# Patient Record
Sex: FEMALE | Race: WHITE
[De-identification: names, ages, dates, MRNs, and addresses within clinical notes are randomized per-mention and may not be internally consistent; named-entity substitution may affect disease eponyms.]

---

## 2019-10-12 ENCOUNTER — HOSPITAL ENCOUNTER (EMERGENCY)
Dept: HOSPITAL 97 - ER | Age: 29
Discharge: HOME | End: 2019-10-12
Payer: SELF-PAY

## 2019-10-12 VITALS — TEMPERATURE: 97.6 F | SYSTOLIC BLOOD PRESSURE: 109 MMHG | DIASTOLIC BLOOD PRESSURE: 73 MMHG | OXYGEN SATURATION: 100 %

## 2019-10-12 DIAGNOSIS — K04.7: Primary | ICD-10-CM

## 2019-10-12 LAB
BUN BLD-MCNC: 14 MG/DL (ref 7–18)
GLUCOSE SERPLBLD-MCNC: 89 MG/DL (ref 74–106)
HCT VFR BLD CALC: 39.3 % (ref 36–45)
LYMPHOCYTES # SPEC AUTO: 1.1 K/UL (ref 0.7–4.9)
PMV BLD: 8.2 FL (ref 7.6–11.3)
POTASSIUM SERPL-SCNC: 3.9 MMOL/L (ref 3.5–5.1)
RBC # BLD: 4.46 M/UL (ref 3.86–4.86)

## 2019-10-12 PROCEDURE — 80048 BASIC METABOLIC PNL TOTAL CA: CPT

## 2019-10-12 PROCEDURE — 96374 THER/PROPH/DIAG INJ IV PUSH: CPT

## 2019-10-12 PROCEDURE — 85025 COMPLETE CBC W/AUTO DIFF WBC: CPT

## 2019-10-12 PROCEDURE — 36415 COLL VENOUS BLD VENIPUNCTURE: CPT

## 2019-10-12 PROCEDURE — 70491 CT SOFT TISSUE NECK W/DYE: CPT

## 2019-10-12 PROCEDURE — 99284 EMERGENCY DEPT VISIT MOD MDM: CPT

## 2019-10-12 PROCEDURE — 96375 TX/PRO/DX INJ NEW DRUG ADDON: CPT

## 2019-10-12 NOTE — ER
Nurse's Notes                                                                                     

 [facility]                                                                 

Name: Ute Iglesias                                                                               

Age: 28 yrs                                                                                       

Sex: Female                                                                                       

: 1990                                                                                   

MRN: [id number]                                                                                   

Arrival Date: 10/12/2019                                                                          

Time: 16:34                                                                                       

Account#: [id number]                                                                            

Bed 14                                                                                            

Private MD:                                                                                       

Diagnosis: Dental Abscess left mandible                                                           

                                                                                                  

Presentation:                                                                                     

10/12                                                                                             

16:37 Presenting complaint: Patient states: I woke up with left sided facial swelling this    la1 

      morning, hurts when I swallow or touch it. Transition of care: patient was not received     

      from another setting of care. Onset of symptoms was 2019. Risk Assessment:      

      Do you want to hurt yourself or someone else? Patient reports no desire to harm self or     

      others. Initial Sepsis Screen: Does the patient meet any 2 criteria? No. Patient's          

      initial sepsis screen is negative. Does the patient have a suspected source of              

      infection? No. Patient's initial sepsis screen is negative. Care prior to arrival: None.    

16:37 Method Of Arrival: Ambulatory                                                           la1 

16:37 Acuity: JANAY 3                                                                           la1 

                                                                                                  

OB/GYN:                                                                                           

16:38 LMP 10/1/2019                                                                           la1 

                                                                                                  

Historical:                                                                                       

- Allergies:                                                                                      

16:38 No Known Allergies;                                                                     la1 

- PMHx:                                                                                           

16:38 PTSD;                                                                                   la1 

                                                                                                  

- Immunization history:: Adult Immunizations up to date.                                          

- Social history:: Smoking status: Patient/guardian denies using tobacco.                         

- Ebola Screening: : No symptoms or risks identified at this time.                                

                                                                                                  

                                                                                                  

Screenin:50 Abuse screen: Denies threats or abuse. Nutritional screening: No deficits noted.        tr5 

      Tuberculosis screening: No symptoms or risk factors identified. Fall Risk None              

      identified.                                                                                 

                                                                                                  

Assessment:                                                                                       

16:50 General: Appears uncomfortable, Behavior is calm, cooperative, appropriate for age.     tr5 

      Pain: Complains of pain in left jaw Pain does not radiate. Pain currently is 9 out of       

      10 on a pain scale. Quality of pain is described as aching, throbbing, Pain began           

      gradually, Is continuous, Alleviated by nothing. Aggravated by eating, drinking. Neuro:     

      Level of Consciousness is awake, alert, obeys commands, Oriented to person, place,          

      time,  are equal bilaterally Moves all extremities. Gait is steady.                    

      Cardiovascular: Heart tones present Capillary refill < 3 seconds Pulses are all             

      present. Edema To L jaw. Respiratory: Airway is patent Respiratory effort is even,          

      unlabored, Respiratory pattern is regular, symmetrical. GI: No signs and/or symptoms        

      were reported involving the gastrointestinal system. : No signs and/or symptoms were      

      reported regarding the genitourinary system. EENT: Poor dentition noted. Derm: Abscess      

      located on left jaw. Musculoskeletal: No signs and/or symptoms reported regarding the       

      musculoskeletal system.                                                                     

18:00 Reassessment: Patient appears in no apparent distress at this time. Patient and/or      tr5 

      family updated on plan of care and expected duration. Pain level reassessed. Patient is     

      alert, oriented x 3, equal unlabored respirations, skin warm/dry/pink.                      

                                                                                                  

Vital Signs:                                                                                      

16:38  / 73; Pulse 89; Resp 16; Temp 97.6; Pulse Ox 100% ; Weight 54.43 kg; Height 5    la1 

      ft. 1 in. (154.94 cm);                                                                      

16:38 Body Mass Index 22.67 (54.43 kg, 154.94 cm)                                             la1 

                                                                                                  

ED Course:                                                                                        

16:34 Patient arrived in ED.                                                                  mr  

16:37 Triage completed.                                                                       la1 

16:38 Rodger Lennon MD is Attending Physician.                                              kdr 

16:38 Arm band placed on left wrist.                                                          la1 

16:50 Bed in low position. Call light in reach. Side rails up X 1. Adult w/ patient.          tr5 

16:50 Initial lab(s) drawn, by me, sent to lab. Inserted saline lock: 20 gauge in right       tr5 

      antecubital area, using aseptic technique.                                                  

16:54 Kit Perdue, RN is Primary Nurse.                                                 tr5 

17:18 Patient moved to CT via wheelchair.                                                     tr5 

17:24 CT Soft Tissue Neck W/contr In Process Unspecified.                                     EDMS

17:27 CT completed. Patient tolerated procedure well. Note: consent for ct exam w/o upt       bq  

      signed. Patient moved back from CT.                                                         

19:17 No provider procedures requiring assistance completed. IV discontinued.                 tr5 

                                                                                                  

Administered Medications:                                                                         

17:00 Drug: NS 0.9% 1000 ml Route: IV; Rate: 1 bolus; Site: right antecubital;                tr5 

17:01 Drug: morphine 4 mg {Note: RASS:0.} Route: IVP; Site: right antecubital;                tr5 

17:30 Follow up: Response: Pain is decreased                                                  tr5 

17:01 Drug: Zofran 4 mg Route: IVP; Site: right antecubital;                                  tr5 

18:21 Follow up: Response: Marked relief of symptoms                                          tr5 

18:12 Drug: Clindamycin 900 mg Route: IVPB; Infused Over: 30 mins; Site: right antecubital;   tr5 

18:13 Drug: Norco 10 mg-325 mg 1 tabs {Note: RASS:0.} Route: PO;                              tr5 

18:20 Drug: SOLU-Medrol 125 mg Route: IVP; Site: right antecubital;                           tr5 

18:21 Drug: Flagyl 500 mg Route: PO;                                                          tr5 

                                                                                                  

                                                                                                  

Outcome:                                                                                          

18:21 Discharge ordered by MD.                                                                madeline 

19:17 Discharged to home ambulatory.                                                          tr5 

19:17 Condition: stable                                                                           

19:17 Discharge instructions given to patient, Instructed on discharge instructions, follow       

      up and referral plans. medication usage, Demonstrated understanding of instructions,        

      follow-up care, medications, Prescriptions given X 4.                                       

19:19 Patient left the ED.                                                                    tr5 

                                                                                                  

Signatures:                                                                                       

Dispatcher [employer]                           EDMS                                                 

Rodger Lennon MD MD kdr Rivera, Mary mr Quilty, Betty bq Attema, Lee, RN                         RN   Kit Figueredo RN                   RN   tr5                                                  

                                                                                                  

**************************************************************************************************

## 2019-10-12 NOTE — EDPHYS
Physician Documentation                                                                           

 [facility]                                                                 

Name: Ute Iglesias                                                                               

Age: 28 yrs                                                                                       

Sex: Female                                                                                       

: 1990                                                                                   

MRN: [id number]                                                                                   

Arrival Date: 10/12/2019                                                                          

Time: 16:34                                                                                       

Account#: [id number]                                                                            

Bed 14                                                                                            

Private MD:                                                                                       

ED Physician Rodger Lennon                                                                       

HPI:                                                                                              

10/12                                                                                             

16:52 This 28 yrs old  Female presents to ER via Ambulatory with complaints of Left  kdr 

      jaw Facial Swelling.                                                                        

18:08 The patient presents with pain, swelling. The problem is located in the lower left      kdr 

      second molar, lower left first molar, lower left second bicuspid and left submandibular     

      area. Onset: The symptoms/episode began/occurred suddenly, gradually, yesterday.            

      Duration: The symptoms are continuous, and are steadily getting worse. Modifying            

      factors: The symptoms are alleviated by nothing, the symptoms are aggravated by             

      chewing, food, Swallowing. Associated signs and symptoms: The patient has no apparent       

      associated signs or symptoms. Severity of symptoms: At their worst the symptoms were        

      moderate, in the emergency department the symptoms are unchanged. The patient has not       

      experienced similar symptoms in the past. The patient has not recently seen a physician.    

                                                                                                  

OB/GYN:                                                                                           

16:38 LMP 10/1/2019                                                                           la1 

                                                                                                  

Historical:                                                                                       

- Allergies:                                                                                      

16:38 No Known Allergies;                                                                     la1 

- PMHx:                                                                                           

16:38 PTSD;                                                                                   la1 

                                                                                                  

- Immunization history:: Adult Immunizations up to date.                                          

- Social history:: Smoking status: Patient/guardian denies using tobacco.                         

- Ebola Screening: : No symptoms or risks identified at this time.                                

                                                                                                  

                                                                                                  

ROS:                                                                                              

18:08 Constitutional: Negative for fever, chills, and weight loss, Eyes: Negative for injury, kdr 

      pain, redness, and discharge, Neck: Negative for injury, pain, and swelling,                

      Cardiovascular: Negative for chest pain, palpitations, and edema, Respiratory: Negative     

      for shortness of breath, cough, wheezing, and pleuritic chest pain, Abdomen/GI:             

      Negative for abdominal pain, nausea, vomiting, diarrhea, and constipation, Back:            

      Negative for injury and pain.                                                               

18:08 ENT: Positive for dental pain, difficulty swallowing, of the left cheek and left            

      mandible.                                                                                   

                                                                                                  

Exam:                                                                                             

18:08 Constitutional:  This is a well developed, well nourished patient who is awake, alert,  kdr 

      and in no acute distress. Eyes:  Pupils equal round and reactive to light, extra-ocular     

      motions intact.  Lids and lashes normal.  Conjunctiva and sclera are non-icteric and        

      not injected.  Cornea within normal limits.  Periorbital areas with no swelling,            

      redness, or edema. Neck:  Trachea midline, no thyromegaly or masses palpated, and no        

      cervical lymphadenopathy.  Supple, full range of motion without nuchal rigidity, or         

      vertebral point tenderness.  No Meningismus. Chest/axilla:  Normal chest wall               

      appearance and motion.  Nontender with no deformity.  No lesions are appreciated.           

18:08 ENT: Mouth: Lips: normal, Oral mucosa: normal, moderate swelling and tenderness to left     

      mandible, No pain or swelling to left parotid gland/salivary gland..                        

                                                                                                  

Vital Signs:                                                                                      

16:38  / 73; Pulse 89; Resp 16; Temp 97.6; Pulse Ox 100% ; Weight 54.43 kg; Height 5    la1 

      ft. 1 in. (154.94 cm);                                                                      

16:38 Body Mass Index 22.67 (54.43 kg, 154.94 cm)                                             la1 

                                                                                                  

MDM:                                                                                              

18:08 Data reviewed: vital signs, nurses notes, lab test result(s), radiologic studies.       kdr 

      Counseling: I had a detailed discussion with the patient and/or guardian regarding: the     

      historical points, exam findings, and any diagnostic results supporting the                 

      discharge/admit diagnosis, lab results, radiology results, the need for outpatient          

      follow up. Special discussion: I discussed with the patient/guardian in detail that at      

      this point there is no indication for admission to the hospital. It is understood,          

      however, that if the symptoms persist or worsen the patient needs to return immediately     

      for re-evaluation.                                                                          

18:21 Patient medically screened.                                                             kdr 

                                                                                                  

10/12                                                                                             

16:51 Order name: CBC with Diff; Complete Time: 17:47                                         kdr 

10/12                                                                                             

16:51 Order name: Chem 7; Complete Time: 17:47                                                kdr 

10/12                                                                                             

16:51 Order name: CT Soft Tissue Neck W/contr; Complete Time: 17:47                           kdr 

                                                                                                  

Administered Medications:                                                                         

17:00 Drug: NS 0.9% 1000 ml Route: IV; Rate: 1 bolus; Site: right antecubital;                tr5 

17:01 Drug: morphine 4 mg {Note: RASS:0.} Route: IVP; Site: right antecubital;                tr5 

17:30 Follow up: Response: Pain is decreased                                                  tr5 

17:01 Drug: Zofran 4 mg Route: IVP; Site: right antecubital;                                  tr5 

18:21 Follow up: Response: Marked relief of symptoms                                          tr5 

18:12 Drug: Clindamycin 900 mg Route: IVPB; Infused Over: 30 mins; Site: right antecubital;   tr5 

18:13 Drug: Norco 10 mg-325 mg 1 tabs {Note: RASS:0.} Route: PO;                              tr5 

18:20 Drug: SOLU-Medrol 125 mg Route: IVP; Site: right antecubital;                           tr5 

18:21 Drug: Flagyl 500 mg Route: PO;                                                          tr5 

                                                                                                  

                                                                                                  

Disposition:                                                                                      

10/12/19 18:21 Discharged to Home. Impression: Dental Abscess left mandible.                      

- Condition is Stable.                                                                            

- Discharge Instructions: Dental Pain, Easy-to-Read, Dental Abscess, Easy-to-Read.                

- Prescriptions for Augmentin 875- 125 mg Oral Tablet - take 1 tablet by ORAL route               

  every 12 hours for 10 days; 20 tablet. Flagyl 500 mg Oral Tablet - take 1 tablet by             

  ORAL route every 6 hours for 10 days; 40 tablet. Tylenol- Codeine #3 300-30 mg Oral             

  Tablet - take 2 tablets by ORAL route every 6 hours As needed; 12 tablet. Medrol                

  (Mynor) 4 mg Oral Tablets, Dose Pack - take 1 tablet by ORAL route as directed - follow           

  package instructions; 1 packet.                                                                 

- Medication Reconciliation Form, Thank You Letter, Antibiotic Education, Prescription            

  Opioid Use, Work release form form.                                                             

- Follow up: Private Physician; When: 2 - 3 days; Reason: If symptoms return, Further             

  diagnostic work-up, Recheck today's complaints, Continuance of care, Re-evaluation by           

  your physician.                                                                                 

- Problem is new.                                                                                 

- Symptoms have improved.                                                                         

                                                                                                  

                                                                                                  

                                                                                                  

Signatures:                                                                                       

Dispatcher [employer]                           EDMS                                                 

Rodger Lennon MD MD kdr Attema, Lee, RN                         RN   la1                                                  

Kit Perdue RN                   RN   tr5                                                  

                                                                                                  

Corrections: (The following items were deleted from the chart)                                    

19:19 18:21 10/12/2019 18:21 Discharged to Home. Impression: Dental Abscess left mandible.    tr5 

      Condition is Stable. Discharge Instructions: Dental Abscess, Easy-to-Read.                  

      Prescriptions for Augmentin 875-125 mg Oral Tablet - take 1 tablet by ORAL route every      

      12 hours for 10 days; 20 tablet, Flagyl 500 mg Oral Tablet - take 1 tablet by ORAL          

      route every 6 hours for 10 days; 40 tablet, Tylenol-Codeine #3 300-30 mg Oral Tablet -      

      take 2 tablets by ORAL route every 6 hours As needed; 12 tablet, Medrol (Mynor) 4 mg Oral     

      Tablets, Dose Pack - take 1 tablet by ORAL route as directed - follow package               

      instructions; 1 packet. and Forms are Medication Reconciliation Form, Thank You Letter,     

      Antibiotic Education, Prescription Opioid Use. Follow up: Private Physician; When: 2 -      

      3 days; Reason: If symptoms return, Further diagnostic work-up, Recheck today's             

      complaints, Continuance of care, Re-evaluation by your physician. Problem is new.           

      Symptoms have improved. kdr                                                                 

                                                                                                  

**************************************************************************************************

## 2019-10-12 NOTE — RAD REPORT
EXAM DESCRIPTION:  CT - Soft Tissue Neck W/Contr

 

CLINICAL HISTORY:  Pain and swelling to left jaw - unable to swallow

 

COMPARISON:  No comparisons

 

TECHNIQUE  All CT scans are performed using dose optimization technique as appropriate and may includ
e automated exposure control or mA/KV adjustment according to patient size.

 

FINDINGS:  Significant soft tissue swelling is seen about the left aspect of the face mandible. There
 is reticulation of the subcutaneous fat and skin thickening present. A subperiosteal abscess is like
ly present along the anterior margin of the mandible measuring 3 mm in thickness an 18 mm in transver
se dimension. Clear etiology for this is not well delineated but may be odontogenic in origin. Mild a
djacent soft tissue is present within the inflamed fat, likely enlarged lymph nodes.

 

Multiple enlarged lymph nodes are present in the level I and level II chain. These are likely reactiv
e in nature. No prevertebral abscess seen.

 

IMPRESSION:  Significant soft tissue swelling is seen about the left aspect of the mandible with subp
eriosteal abscess suspected anteriorly as described.

## 2020-02-16 ENCOUNTER — HOSPITAL ENCOUNTER (EMERGENCY)
Dept: HOSPITAL 97 - ER | Age: 30
Discharge: HOME | End: 2020-02-16
Payer: SELF-PAY

## 2020-02-16 VITALS — SYSTOLIC BLOOD PRESSURE: 119 MMHG | TEMPERATURE: 97.7 F | OXYGEN SATURATION: 99 % | DIASTOLIC BLOOD PRESSURE: 87 MMHG

## 2020-02-16 DIAGNOSIS — N30.00: Primary | ICD-10-CM

## 2020-02-16 DIAGNOSIS — F17.210: ICD-10-CM

## 2020-02-16 LAB
BUN BLD-MCNC: 11 MG/DL (ref 7–18)
GLUCOSE SERPLBLD-MCNC: 109 MG/DL (ref 74–106)
HCT VFR BLD CALC: 42 % (ref 36–45)
LYMPHOCYTES # SPEC AUTO: 1.3 K/UL (ref 0.7–4.9)
PMV BLD: 8.1 FL (ref 7.6–11.3)
POTASSIUM SERPL-SCNC: 3.8 MMOL/L (ref 3.5–5.1)
RBC # BLD: 4.78 M/UL (ref 3.86–4.86)
UA COMPLETE W REFLEX CULTURE PNL UR: (no result)

## 2020-02-16 PROCEDURE — 80048 BASIC METABOLIC PNL TOTAL CA: CPT

## 2020-02-16 PROCEDURE — 81025 URINE PREGNANCY TEST: CPT

## 2020-02-16 PROCEDURE — 85025 COMPLETE CBC W/AUTO DIFF WBC: CPT

## 2020-02-16 PROCEDURE — 87086 URINE CULTURE/COLONY COUNT: CPT

## 2020-02-16 PROCEDURE — 81015 MICROSCOPIC EXAM OF URINE: CPT

## 2020-02-16 PROCEDURE — 99283 EMERGENCY DEPT VISIT LOW MDM: CPT

## 2020-02-16 PROCEDURE — 87088 URINE BACTERIA CULTURE: CPT

## 2020-02-16 PROCEDURE — 36415 COLL VENOUS BLD VENIPUNCTURE: CPT

## 2020-02-16 PROCEDURE — 76770 US EXAM ABDO BACK WALL COMP: CPT

## 2020-02-16 PROCEDURE — 81003 URINALYSIS AUTO W/O SCOPE: CPT

## 2020-02-16 NOTE — EDPHYS
Physician Documentation                                                                           

 [facility]                                                                 

Name: Ute Iglesias                                                                               

Age: 29 yrs                                                                                       

Sex: Female                                                                                       

: 1990                                                                                   

MRN: [id number]                                                                                   

Arrival Date: 2020                                                                          

Time: 12:10                                                                                       

Account#: [id number]                                                                            

Bed 24                                                                                            

Private MD:                                                                                       

ED Physician Oleg Whitney                                                                      

HPI:                                                                                              

                                                                                             

13:29 This 29 yrs old  Female presents to ER via Ambulatory with complaints of achy, jr8 

      flank pain.                                                                                 

13:29 The patient complains of pain in the right flank. The pain does not radiate. Onset: The jr8 

      symptoms/episode began/occurred gradually, 2 week(s) ago. Modifying factors: The            

      symptoms are alleviated by nothing. the symptoms are aggravated by nothing. Associated      

      signs and symptoms: The patient has no apparent associated signs or symptoms. Severity      

      of pain: At its worst the pain was mild in the emergency department the pain is             

      unchanged. The patient has not experienced similar symptoms in the past. The patient        

      has not recently seen a physician. Stated that she has felt achy. Has had flank pain on     

      right side on/off as well that is not going away .                                          

                                                                                                  

Historical:                                                                                       

- Allergies:                                                                                      

12:23 No Known Allergies;                                                                     ss  

- Home Meds:                                                                                      

12:23 None [Active];                                                                          ss  

- PMHx:                                                                                           

12:23 PTSD;                                                                                   ss  

- PSHx:                                                                                           

12:23 R wrist;                                                                                ss  

                                                                                                  

- Immunization history:: Adult Immunizations up to date.                                          

- Coronavirus screen:: The patient has NOT traveled to [place] in the past 14 days.                 

  Proceed with normal triage process as indicated.                                                

- Social history:: Smoking status: Patient reports the use of cigarette tobacco                   

  products, smokes one-half pack cigarettes per day.                                              

- Ebola Screening: : Patient denies exposure to infectious person Patient denies travel           

  to an Ebola-affected area in the 21 days before illness onset.                                  

                                                                                                  

                                                                                                  

ROS:                                                                                              

13:29 Eyes: Negative for injury, pain, redness, and discharge, ENT: Negative for injury,      jr8 

      pain, and discharge, Neck: Negative for injury, pain, and swelling, Cardiovascular:         

      Negative for chest pain, palpitations, and edema, Respiratory: Negative for shortness       

      of breath, cough, wheezing, and pleuritic chest pain, Abdomen/GI: Negative for              

      abdominal pain, nausea, vomiting, diarrhea, and constipation, MS/Extremity: Negative        

      for injury and deformity, Skin: Negative for injury, rash, and discoloration, Neuro:        

      Negative for headache, weakness, numbness, tingling, and seizure.                           

13:29 Back: Positive for flank pain, on the right.                                                

                                                                                                  

Exam:                                                                                             

13:29 Eyes:  Pupils equal round and reactive to light, extra-ocular motions intact.  Lids and jr8 

      lashes normal.  Conjunctiva and sclera are non-icteric and not injected.  Cornea within     

      normal limits.  Periorbital areas with no swelling, redness, or edema. ENT:  Nares          

      patent. No nasal discharge, no septal abnormalities noted.  Tympanic membranes are          

      normal and external auditory canals are clear.  Oropharynx with no redness, swelling,       

      or masses, exudates, or evidence of obstruction, uvula midline.  Mucous membranes           

      moist. Neck:  Trachea midline, no thyromegaly or masses palpated, and no cervical           

      lymphadenopathy.  Supple, full range of motion without nuchal rigidity, or vertebral        

      point tenderness.  No Meningismus. Cardiovascular:  Regular rate and rhythm with a          

      normal S1 and S2.  No gallops, murmurs, or rubs.  Normal PMI, no JVD.  No pulse             

      deficits. Respiratory:  Lungs have equal breath sounds bilaterally, clear to                

      auscultation and percussion.  No rales, rhonchi or wheezes noted.  No increased work of     

      breathing, no retractions or nasal flaring. Abdomen/GI:  Soft, non-tender, with normal      

      bowel sounds.  No distension or tympany.  No guarding or rebound.  No evidence of           

      tenderness throughout. Back:  No spinal tenderness.  No costovertebral tenderness.          

      Full range of motion. Skin:  Warm, dry with normal turgor.  Normal color with no            

      rashes, no lesions, and no evidence of cellulitis. MS/ Extremity:  Pulses equal, no         

      cyanosis.  Neurovascular intact.  Full, normal range of motion. Neuro:  Awake and           

      alert, GCS 15, oriented to person, place, time, and situation.  Cranial nerves II-XII       

      grossly intact.  Motor strength 5/5 in all extremities.  Sensory grossly intact.            

      Cerebellar exam normal.  Normal gait.                                                       

                                                                                                  

Vital Signs:                                                                                      

12:23  / 87; Pulse 94; Resp 17; Temp 97.7(O); Pulse Ox 99% on R/A; Weight 54.43 kg;       

      Height 5 ft. 2 in. (157.48 cm); Pain 4/10;                                                  

12:23 Body Mass Index 21.95 (54.43 kg, 157.48 cm)                                             ss  

                                                                                                  

MDM:                                                                                              

12:21 Patient medically screened.                                                             [facility] 

13:54 Data reviewed: vital signs, nurses notes, lab test result(s), radiologic studies,       [facility] 

      ultrasound. Data interpreted: Pulse oximetry: on room air is 99 %. Interpretation:          

      normal. Counseling: I had a detailed discussion with the patient and/or guardian            

      regarding: the historical points, exam findings, and any diagnostic results supporting      

      the discharge/admit diagnosis, lab results, radiology results, the need for outpatient      

      follow up, a family practitioner, to return to the emergency department if symptoms         

      worsen or persist or if there are any questions or concerns that arise at home.             

                                                                                                  

                                                                                             

12:32 Order name: Urine Microscopic Only                                                        

                                                                                             

12:33 Order name: Urine Dipstick--Ancillary (enter results)                                     

                                                                                             

12:33 Order name: Urine Pregnancy--Ancillary (enter results)                                    

                                                                                             

12:34 Order name: CBC with Diff                                                               [facility] 

                                                                                             

12:34 Order name: Basic Metabolic Panel                                                       [facility] 

                                                                                             

12:44 Order name: Urine Pregnancy--Ancillary; Complete Time: 13:12                            [facility]

                                                                                             

12:34 Order name: IV; Complete Time: 12:43                                                    [facility] 

                                                                                             

12:34 Order name: US Rp Exam Complete                                                         [facility] 

                                                                                             

12:44 Order name: Urine Dipstick-Ancillary; Complete Time: 13:11                              [facility]

                                                                                             

13:02 Order name: CBC with Automated Diff; Complete Time: 13:11                               [facility]

                                                                                             

13:02 Order name: Basic Metabolic Panel; Complete Time: 13:11                                 [facility]

                                                                                             

13:14 Order name: Urine Microscopic Only; Complete Time: 13:14                                [facility]

                                                                                                  

Administered Medications:                                                                         

No medications were administered                                                                  

                                                                                                  

                                                                                                  

Disposition:                                                                                      

                                                                                             

08:31 Co-signature as Attending Physician, Oleg Whitney MD I agree with the assessment and  [place] 

      plan of care.                                                                               

                                                                                                  

Disposition:                                                                                      

20 13:55 Discharged to Home. Impression: Acute cystitis.                                    

- Condition is Stable.                                                                            

- Discharge Instructions: Urinary Tract Infection, Adult.                                         

- Prescriptions for Macrobid 100 mg Oral Capsule - take 1 capsule by ORAL route every             

  12 hours for 7 days; 14 capsule.                                                                

- Work release form, Medication Reconciliation Form, Thank You Letter, Antibiotic                 

  Education, Prescription Opioid Use form.                                                        

- Follow up: Private Physician; When: 2 - 3 days; Reason: Recheck today's complaints,             

  Continuance of care, Re-evaluation by your physician.                                           

- Problem is new.                                                                                 

- Symptoms have improved.                                                                         

                                                                                                  

                                                                                                  

                                                                                                  

Signatures:                                                                                       

Dispatcher [employer]                           Oleg Macario MD MD cha Smirch, Shelby, RN                      RN   Manuel Kothari PA PA   jr8                                                  

                                                                                                  

Corrections: (The following items were deleted from the chart)                                    

                                                                                             

14:04 13:55 2020 13:55 Discharged to Home. Impression: Acute cystitis. Condition is     ss  

      Stable. Forms are Medication Reconciliation Form, Thank You Letter, Antibiotic              

      Education, Prescription Opioid Use. Follow up: Private Physician; When: 2 - 3 days;         

      Reason: Recheck today's complaints, Continuance of care, Re-evaluation by your              

      physician. Problem is new. Symptoms have improved. jr8                                      

                                                                                                  

**************************************************************************************************

## 2020-02-16 NOTE — RAD REPORT
EXAM DESCRIPTION:  US - Renal Ultrasound-Complete - 2/16/2020 2:49 pm

 

CLINICAL HISTORY:  . Right flank pain

 

COMPARISON:  None.

 

FINDINGS:  The right kidney measures 10 cm with borderline increased echotexture

 

The left kidney measures 10 centimeters with borderline increased echotexture .

 

Hydronephrosis is not seen. No gross abnormality of bladder

 

IMPRESSION:  Borderline increased renal echotexture. This may indicate parenchymal disease

## 2020-02-16 NOTE — ER
Nurse's Notes                                                                                     

 [facility]                                                                 

Name: Ute Iglesias                                                                               

Age: 29 yrs                                                                                       

Sex: Female                                                                                       

: 1990                                                                                   

MRN: [id number]                                                                                   

Arrival Date: 2020                                                                          

Time: 12:10                                                                                       

Account#: [id number]                                                                            

Bed 24                                                                                            

Private MD:                                                                                       

Diagnosis: Acute cystitis                                                                         

                                                                                                  

Presentation:                                                                                     

                                                                                             

12:21 Presenting complaint: Patient states: body aches, intermittent fever, congestion and    ss  

      fatigue x 2 weeks. Transition of care: patient was not received from another setting of     

      care. Onset of symptoms was 2020. Risk Assessment: Do you want to hurt          

      yourself or someone else? Patient reports no desire to harm self or others. Initial         

      Sepsis Screen: Does the patient meet any 2 criteria? HR > 90 bpm. Does the patient have     

      a suspected source of infection? No. Patient's initial sepsis screen is negative. Care      

      prior to arrival: None.                                                                     

12:21 Method Of Arrival: Ambulatory                                                           ss  

12:21 Acuity: JANAY 4                                                                           ss  

                                                                                                  

Historical:                                                                                       

- Allergies:                                                                                      

12:23 No Known Allergies;                                                                     ss  

- Home Meds:                                                                                      

12:23 None [Active];                                                                          ss  

- PMHx:                                                                                           

12:23 PTSD;                                                                                   ss  

- PSHx:                                                                                           

12:23 R wrist;                                                                                ss  

                                                                                                  

- Immunization history:: Adult Immunizations up to date.                                          

- Coronavirus screen:: The patient has NOT traveled to [place] in the past 14 days.                 

  Proceed with normal triage process as indicated.                                                

- Social history:: Smoking status: Patient reports the use of cigarette tobacco                   

  products, smokes one-half pack cigarettes per day.                                              

- Ebola Screening: : Patient denies exposure to infectious person Patient denies travel           

  to an Ebola-affected area in the 21 days before illness onset.                                  

                                                                                                  

                                                                                                  

Screenin:23 Abuse screen: Denies threats or abuse. Denies injuries from another. Nutritional        ss  

      screening: No deficits noted. Tuberculosis screening: Never had TB. Fall Risk None          

      identified.                                                                                 

                                                                                                  

Assessment:                                                                                       

12:23 General: Appears in no apparent distress. comfortable, Behavior is Reports fever for >  ss  

      3 days, feeling ill for x 2 weeks. fatigue for >3 days. Pain: Complains of pain in          

      general body aches Pain currently is 4 out of 10 on a pain scale. Neuro: Level of           

      Consciousness is awake, alert, obeys commands, Oriented to person, place, time,             

      situation. Cardiovascular: Capillary refill < 3 seconds is brisk in bilateral fingers.      

      Respiratory: Respiratory effort is even, unlabored, Respiratory pattern is regular,         

      symmetrical. Respiratory: Reports cough that is non-productive, Breath sounds are clear     

      bilaterally. GI: Abdomen is non-distended, Patient currently denies abdominal pain,         

      nausea, vomiting. : Denies burning with urination, urinary frequency. EENT: Nares are     

      clear. EENT: Reports nasal congestion. Derm: Skin is intact, is healthy with good           

      turgor, Skin is dry, Skin is pink, warm \T\ dry. normal. Musculoskeletal: Circulation,      

      motion, and sensation intact. Range of motion: intact in all extremities, Swelling          

      absent.                                                                                     

13:30 Reassessment: Patient appears in no apparent distress at this time. Patient and/or      ss  

      family updated on plan of care and expected duration. Pain level reassessed. Patient is     

      alert, oriented x 3, equal unlabored respirations, skin warm/dry/pink.                      

                                                                                                  

Vital Signs:                                                                                      

12:23  / 87; Pulse 94; Resp 17; Temp 97.7(O); Pulse Ox 99% on R/A; Weight 54.43 kg;     ss  

      Height 5 ft. 2 in. (157.48 cm); Pain 4/10;                                                  

12:23 Body Mass Index 21.95 (54.43 kg, 157.48 cm)                                               

                                                                                                  

ED Course:                                                                                        

12:10 Patient arrived in ED.                                                                  mr  

12:20 Manuel Colindres PA is PHCP.                                                               jr8 

12:20 Oleg Whitney MD is Attending Physician.                                             [id number] 

12:21 Swati Vasquez, RN is Primary Nurse.                                                      

12:22 Triage completed.                                                                         

12:23 Arm band placed on right wrist.                                                         ss  

12:23 Patient has correct armband on for positive identification. Bed in low position. Call   ss  

      light in reach.                                                                             

12:43 CBC with Diff Sent.                                                                       

12:43 Basic Metabolic Panel Sent.                                                               

12:43 Urine Pregnancy--Ancillary (enter results) Sent.                                          

12:43 Urine Dipstick--Ancillary (enter results) Sent.                                           

12:43 Urine Microscopic Only Sent.                                                              

14:03 No provider procedures requiring assistance completed. IV discontinued, intact,         ss  

      bleeding controlled, No redness/swelling at site. Pressure dressing applied.                

                                                                                                  

Administered Medications:                                                                         

No medications were administered                                                                  

                                                                                                  

                                                                                                  

Outcome:                                                                                          

13:55 Discharge ordered by MD.                                                                [id number] 

14:03 Discharged to home ambulatory.                                                            

14:03 Condition: good                                                                             

14:03 Discharge instructions given to patient, Instructed on discharge instructions, follow       

      up and referral plans. medication usage, Demonstrated understanding of instructions,        

      follow-up care, medications, Prescriptions given X 1.                                       

14:04 Patient left the ED.                                                                      

                                                                                                  

Signatures:                                                                                       

Ursula Leal Shelby, RN                      RN                                                      

Manuel Colindres PA PA   jr8                                                  

                                                                                                  

**************************************************************************************************

## 2020-05-24 ENCOUNTER — HOSPITAL ENCOUNTER (EMERGENCY)
Dept: HOSPITAL 97 - ER | Age: 30
Discharge: LEFT BEFORE DISCHARGE | End: 2020-05-24
Payer: SELF-PAY

## 2020-05-24 DIAGNOSIS — Z02.9: Primary | ICD-10-CM

## 2020-05-24 DIAGNOSIS — Z53.21: ICD-10-CM

## 2020-05-24 NOTE — XMS REPORT
Clinical Summary

                             Created on:May 24, 2020



Patient:Ute Iglesias

Sex:Female

:1990

External Reference #:[id number]





Demographics







                          Address                   [address]



                                                    [place] [address]

 

                          Home Phone                [phone]

 

                          Mobile Phone              [phone]

 

                          Preferred Language        ENG

 

                          Marital Status            Single

 

                          Protestant Affiliation     Unknown

 

                          Race                      Unknown

 

                          Ethnic Group              Unknown









Author







                          Organization              [facility]

ict

 

                          Address                   [address] [place] [id number]









Support







                Name            Relationship    Address         Phone

 

                Zuleyka Bill    Unavailable     Unavailable     [phone]









Care Team Providers







                    Name                Role                Phone

 

                    Unavailable         Primary Care Provider Unavailable









Allergies

No Known Allergies



Medications







          Medication Sig       Dispensed Refills   Start Date End Date  Status

 

          ibuprofen (MOTRIN) 800 Take 1 tablet by 30 tablet 0         2018

           Active



          mg tabletIndications: mouth every 8                                   

      



          Pain, dental hours as needed                                         



                    for Pain.                                         

 

          acetaminophen-codeine Take 1 tablet by 13 tablet 0         2018 

          Active



          (TYLENOL/CODEINE #3) mouth every 4                                    

     



          300-30 mg per hours as needed                                         



          tabletIndications: for Pain.                                         



          Pain, dental                                                   







Active Problems







                          Problem                   Noted Date

 

                          Pain, dental              







Social History







             Tobacco Use  Types        Packs/Day    Years Used   Date

 

             Never Smoker                                        









                Smokeless Tobacco: Never Used                                 









                Alcohol Use     Drinks/Week     oz/Week         Comments

 

                Yes                                             









                          Sex Assigned at Birth     Date Recorded

 

                          Not on file               









                    Job Start Date      Occupation          Industry

 

                    Not on file         Not on file         Not on file









                    Travel History      Travel Start        Travel End









                                        No recent travel history available.







Last Filed Vital Signs

Not on file



Plan of Treatment







                Health Maintenance Due Date        Last Done       Comments

 

                Cervical Cancer Scrn (3 Yrs) 2010                      

 

                IMM Influenza Seasonal Oct to March (>/= 19 yrs) 10/01/2020     

                 







Results

Not on fileafter 2019



Insurance







          Payer     Benefit Plan / Subscriber ID Effective Dates Phone     Addre

ss   Type



                    Group                                             

 

          [facility]      SELF-PAY  xxxxxxxxx 2018-Pres [phone] [id number] 



           SELF-PAY   UNSCREENED            ent        [address] [place] [address] 



                                                            [id number]     









           Guarantor Name Account Type Relation to Date of    Phone      Billing

 Address



                                 Patient    Birth                 

 

           Ute Iglesias Personal/Famil Self       1990 [phone]



           Melany     y                                (Home)     [address]







                                                                  [place]



                                                                  [id number]

## 2020-05-24 NOTE — ER
Nurse's Notes                                                                                     

 [facility]                                                                 

Name: Ute Iglesias                                                                               

Age: 29 yrs                                                                                       

Sex: Female                                                                                       

: 1990                                                                                   

MRN: [id number]                                                                                   

Arrival Date: 2020                                                                          

Time: 02:47                                                                                       

Account#: [id number]                                                                            

Bed Waiting                                                                                       

Private MD:                                                                                       

Diagnosis:                                                                                        

                                                                                                  

ED Course:                                                                                        

                                                                                             

02:47 Patient arrived in ED.                                                                  cl3 

03:50 Patient's name was called from ER lobby. No response. Unable to locate patient. Will    lp1 

      disposition as left without being seen by a provider.                                       

                                                                                                  

Administered Medications:                                                                         

No medications were administered                                                                  

                                                                                                  

                                                                                                  

Outcome:                                                                                          

03:50 Patient left the ED.                                                                    lp1 

                                                                                                  

Signatures:                                                                                       

Janae Lozano RN                         RN   lp1                                                  

Jonah Morris                                cl3                                                  

                                                                                                  

**************************************************************************************************

## 2020-05-24 NOTE — XMS REPORT
Patient Summary Document

                             Created on:May 24, 2020



Patient:MILANA VOGEL

Sex:Female

:1990

External Reference #:[id number]





Demographics







                          Address                   [address]



                                                    [place] [address]

 

                          Home Phone                [phone]

 

                          Mobile Phone              [phone]

 

                          Email Address             NONE

 

                          Preferred Language        English

 

                          Marital Status            Unknown

 

                          Nondenominational Affiliation     Unknown

 

                          Race                      Unknown

 

                          Additional Race(s)        Unavailable

 

                          Ethnic Group              Unknown









Author







                          Organization              [facility]

t

 

                          Address                   [address]



                                                    [place] [address]

 

                          Phone                     [phone]









Support







                Name            Relationship    Address         Phone

 

                Fly         Other           Unavailable     [phone]









Care Team Providers







                    Name                Role                Phone

 

                    Unavailable         Unavailable         Unavailable









Problems







       Condition Condition Condition Status Onset  Resolution Last   Treating Co

mments 

Source



       Name   Details Category        Date   Date   Treatment Clinician        



                                                 Date                 

 

       Pain,  Pain,  Disease Active                                    [place]



       dental dental                                                  Health







Allergies, Adverse Reactions, Alerts

This patient has no known allergies or adverse reactions.



Social History







           Social Habit Start Date Stop Date  Quantity   Comments   Source

 

           Sex Assigned At                                             [facility]

alth



           Birth                                                  

 

           Alcohol intake 2018                       [facility]

lt



                      00:00:00   00:00:00                         









                Smoking Status  Start Date      Stop Date       Source

 

                Never smoker                                    [facility]







Medications







       Ordered Filled Start  Stop   Current Ordering Indication Dosage Frequency

 Signature

                    Comments            Components          Source



     Medication Medication Date Date Medication? Clinician                (SIG) 

          



     Name Name                                                   

 

     ibuprofen      2018      Yes       Pain, 800mg      Take 1           [facility]

is



     (MOTRIN)      2-30                dental           tablet by           [facility]

th



     800 mg      00:00:                               mouth           



     tablet      00                                 every 8           



                                                  hours as           



                                                  needed for           



                                                  Pain.           

 

     acetaminoph      2018      Yes       Pain, 1{tbl}      Take 1           [facility]

[facility]



     en-codeine      2-30                dental           tablet by           

alth



     (TYLENOL/CO      00:00:                               mouth           



     DEINE #3)      00                                 every 4           



     300-30 mg                                         hours as           



     per tablet                                         needed for           



                                                  Pain.           







Procedures

This patient has no known procedures.



Plan of Care







             Planned Activity Planned Date Details      Comments     Source

 

             Future Scheduled Test 2020-10-01 00:00:00 IMM Influenza            

  [facility]



                                       Seasonal Oct to March              



                                       (>/= 19 yrs) [code =              



                                       IMM Influenza              



                                       Seasonal Oct to March              



                                       (>/= 19 yrs)]              

 

             Future Scheduled Test 2010 00:00:00 Cervical Cancer Scrn     

         [facility]



                                       (3 Yrs) [code =              



                                       Cervical Cancer [facility]              



                                       (3 Yrs)]                  







Encounters







        Start   End     Encounter Admission Attending Care    Care    Encounter 

Source



        Date/Time Date/Time Type    Type    Clinicians Facility Department ID   

   

 

        2018 Emergency                 [facility]     [id number]

[facility]



        16:32:17 16:32:17                                                 Health

 

        2018 Emergency                 [facility]     [id number]

[facility]



        16:31:11 16:31:11                                                 Health







Results

This patient has no known results.